# Patient Record
Sex: MALE | Race: WHITE | Employment: UNEMPLOYED | URBAN - METROPOLITAN AREA
[De-identification: names, ages, dates, MRNs, and addresses within clinical notes are randomized per-mention and may not be internally consistent; named-entity substitution may affect disease eponyms.]

---

## 2023-02-20 ENCOUNTER — HOSPITAL ENCOUNTER (EMERGENCY)
Age: 28
Discharge: HOME | End: 2023-02-20
Attending: EMERGENCY MEDICINE

## 2023-02-20 VITALS
HEIGHT: 73 IN | TEMPERATURE: 98.2 F | RESPIRATION RATE: 16 BRPM | WEIGHT: 175 LBS | OXYGEN SATURATION: 100 % | SYSTOLIC BLOOD PRESSURE: 154 MMHG | BODY MASS INDEX: 23.19 KG/M2 | DIASTOLIC BLOOD PRESSURE: 79 MMHG | HEART RATE: 87 BPM
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
## 2023-02-20 PROCEDURE — 90714 TD VACC NO PRESV 7 YRS+ IM: CPT | Performed by: EMERGENCY MEDICINE

## 2023-02-20 PROCEDURE — 90471 IMMUNIZATION ADMIN: CPT

## 2023-02-20 PROCEDURE — 99284 EMERGENCY DEPT VISIT MOD MDM: CPT

## 2023-02-20 PROCEDURE — 74011250637 HC RX REV CODE- 250/637: Performed by: EMERGENCY MEDICINE

## 2023-02-20 PROCEDURE — 74011000250 HC RX REV CODE- 250: Performed by: EMERGENCY MEDICINE

## 2023-02-20 PROCEDURE — 74011250636 HC RX REV CODE- 250/636: Performed by: EMERGENCY MEDICINE

## 2023-02-20 PROCEDURE — 75810000293 HC SIMP/SUPERF WND  RPR

## 2023-02-20 RX ORDER — ACETAMINOPHEN 500 MG
1000 TABLET ORAL ONCE
Status: COMPLETED | OUTPATIENT
Start: 2023-02-20 | End: 2023-02-20

## 2023-02-20 RX ORDER — BACITRACIN 500 [USP'U]/G
OINTMENT TOPICAL 3 TIMES DAILY
Qty: 1 EACH | Refills: 0 | Status: SHIPPED | OUTPATIENT
Start: 2023-02-20 | End: 2023-03-02

## 2023-02-20 RX ORDER — BACITRACIN ZINC 500 UNIT/G
1 OINTMENT IN PACKET (EA) TOPICAL DAILY
Status: DISCONTINUED | OUTPATIENT
Start: 2023-02-20 | End: 2023-02-20 | Stop reason: HOSPADM

## 2023-02-20 RX ORDER — LIDOCAINE HYDROCHLORIDE AND EPINEPHRINE 10; 10 MG/ML; UG/ML
10 INJECTION, SOLUTION INFILTRATION; PERINEURAL
Status: COMPLETED | OUTPATIENT
Start: 2023-02-20 | End: 2023-02-20

## 2023-02-20 RX ORDER — LIDOCAINE HYDROCHLORIDE 10 MG/ML
5 INJECTION INFILTRATION; PERINEURAL ONCE
Status: COMPLETED | OUTPATIENT
Start: 2023-02-20 | End: 2023-02-20

## 2023-02-20 RX ADMIN — LIDOCAINE HYDROCHLORIDE AND EPINEPHRINE 100 MG: 10; 10 INJECTION, SOLUTION INFILTRATION; PERINEURAL at 17:43

## 2023-02-20 RX ADMIN — CLOSTRIDIUM TETANI TOXOID ANTIGEN (FORMALDEHYDE INACTIVATED) AND CORYNEBACTERIUM DIPHTHERIAE TOXOID ANTIGEN (FORMALDEHYDE INACTIVATED) 0.5 ML: 5; 2 INJECTION, SUSPENSION INTRAMUSCULAR at 17:41

## 2023-02-20 RX ADMIN — LIDOCAINE HYDROCHLORIDE 5 ML: 10 INJECTION, SOLUTION INFILTRATION; PERINEURAL at 17:43

## 2023-02-20 RX ADMIN — ACETAMINOPHEN 1000 MG: 500 TABLET ORAL at 17:41

## 2023-02-20 RX ADMIN — Medication 1 PACKET: at 19:01

## 2023-02-20 NOTE — ED NOTES
Emergency Department Nursing Plan of Care       The Nursing Plan of Care is developed from the Nursing assessment and Emergency Department Attending provider initial evaluation. The plan of care may be reviewed in the ED Provider note.     The Plan of Care was developed with the following considerations:   Patient / Family readiness to learn indicated by:verbalized understanding  Persons(s) to be included in education: patient  Barriers to Learning/Limitations:No    Signed     Romina Webb RN    2/20/2023   6:10 PM

## 2023-02-20 NOTE — ED PROVIDER NOTES
EMERGENCY DEPARTMENT HISTORY AND PHYSICAL EXAM      Date: 2/20/2023  Patient Name: James Ferguson. History of Presenting Illness     Chief Complaint   Patient presents with    Laceration       History Provided By: Patient and Patient's Mother    HPI: James Perez, 32 y.o. male with a past medical history significant for denies presents to the ED with cc of laceration. Pt was cutting down trees and after finishing was putting chainsaw down and it clipped his L leg, cutting just below his R knee. Ambulatory on scene and since. Tetanus not up to date. There are no other complaints, changes, or physical findings at this time. PCP: No primary care provider on file. Current Facility-Administered Medications   Medication Dose Route Frequency Provider Last Rate Last Admin    lidocaine (XYLOCAINE) 10 mg/mL (1 %) injection 5 mL  5 mL IntraVENous ONCE Rhett Cheatham MD        tetanus-diphtheria Toxiods-PF 5-2 Lf unit/0.5 mL injection 0.5 mL  0.5 mL IntraMUSCular ONCE Rhett Cheatham MD        acetaminophen (TYLENOL) tablet 1,000 mg  1,000 mg Oral ONCE Rhett Cheatham MD        lidocaine-EPINEPHrine (XYLOCAINE) 1 %-1:100,000 injection 100 mg  10 mL IntraDERMal NOW Margaux Grier MD           Past History     Past Medical History:  History reviewed. No pertinent past medical history. Past Surgical History:  No past surgical history on file. Family History:  History reviewed. No pertinent family history. Social History: Allergies: Allergies   Allergen Reactions    Ceclor Cd Other (comments)    Penicillin Hives         Review of Systems   Constitutional: Negative except as in HPI. Eyes: Negative except as in HPI.  ENT: Negative except as in HPI. Cardiovascular: Negative except as in HPI. Respiratory: Negative except as in HPI. Gastrointestinal: Negative except as in HPI. Genitourinary: Negative except as in HPI. Musculoskeletal: Negative except as in HPI.   Integumentary: Negative except as in HPI. Neurological: Negative except as in HPI. Psychiatric: Negative except as in HPI. Endocrine: Negative except as in HPI. Hematologic/Lymphatic: Negative except as in HPI. Allergic/Immunologic: Negative except as in HPI. Physical Exam   Constitutional: Awake and alert, interactive, NAD  Eyes: PERRL, no injection or scleral icterus, no discharge  HEENT: NCAT, neck supple, MMM  CV: RRR  Respiratory: Unlabored  : Deferred  MSK: FROM, no joint effusions or edema. Able to flex and extend LLE against resistance without difficulty. Skin: 1cm superficial laceration medial aspect of L knee around tibial plateau, below it an approximately 4cm laceration with exposed fat and fascia but no violation of fascia or visualized tendon injuries. Neuro: CN2-12 intact, symmetric facies, fluent speech. Psych: Well-groomed, normal speech, behavior, appropriate mood    Lab and Diagnostic Study Results     Labs -   No results found for this or any previous visit (from the past 12 hour(s)). Radiologic Studies -   [unfilled]  CT Results  (Last 48 hours)      None          CXR Results  (Last 48 hours)      None            Medical Decision Making and ED Course   - I am the first and primary provider for this patient AND AM THE PRIMARY PROVIDER OF RECORD. - I reviewed the vital signs, available nursing notes, past medical history, past surgical history, family history and social history. - Initial assessment performed. The patients presenting problems have been discussed, and the staff are in agreement with the care plan formulated and outlined with them. I have encouraged them to ask questions as they arise throughout their visit. Vital Signs-Reviewed the patient's vital signs.     Patient Vitals for the past 12 hrs:   Temp Pulse Resp BP SpO2   02/20/23 1705 98.2 °F (36.8 °C) (!) 115 18 (!) 147/88 100 %       EKG interpretation:         Provider Notes (Medical Decision Making):   27M w/chainsaw injury to LLE causing laceration. Fortunately, wound appears superficial without violation of fascia/knee capsule or tendon injury. Will update tetanus and suture laceration closed with return precautions. ED Course:       ED Course as of 02/20/23 1851 Mon Feb 20, 2023 1815 Procedure Note - Laceration Repair:  6:16 PM  Procedure by AMY Lemus NP  Complexity: simple   4cm linear laceration to lower leg  was irrigated copiously with NS under jet lavage,  and draped in a sterile fashion. The area was anesthetized via local infiltration of 3 mL lidocaine 1% without epinephrine. The wound was explored with the following results: No foreign bodies found, No tendon laceration seen. The wound was repaired with One layer suture closure: Skin Layer:  12 sutures placed, stitch type:simple interrupted, suture: 4-0 prolene. .  The wound was closed with good hemostasis and approximation. Sterile dressing applied. Estimated blood loss: minimal  The procedure took 16-30 minutes, and pt tolerated well. [KR]   1849 Wound well-appearing, will discharge with return precautions. [YA]      ED Course User Index  [KR] Myla Cardozo NP  [YA] Roby Rowell MD         Procedures and Critical Care     See lac repair note above by JEREL Ling    Disposition     Disposition: DC- Adult Discharges: All of the diagnostic tests were reviewed and questions answered. Diagnosis, care plan and treatment options were discussed. The patient understands the instructions and will follow up as directed. The patients results have been reviewed with them. They have been counseled regarding their diagnosis. The patient verbally convey understanding and agreement of the signs, symptoms, diagnosis, treatment and prognosis and additionally agrees to follow up as recommended with their PCP in 24 - 48 hours. They also agree with the care-plan and convey that all of their questions have been answered.   I have also put together some discharge instructions for them that include: 1) educational information regarding their diagnosis, 2) how to care for their diagnosis at home, as well a 3) list of reasons why they would want to return to the ED prior to their follow-up appointment, should their condition change. Discharged      Diagnosis     Clinical Impression:   1. Contact with chainsaw as cause of accidental injury        Attestations:     Samanta Li MD

## 2023-02-20 NOTE — DISCHARGE INSTRUCTIONS
You should take tylenol or ibuprofen for aches and pains for the next few days. You can alternate these every 3 hours so that you always have something on board. For instance, you can take tylenol at 9am, ibuprofen at noon, tylenol again at 3pm and ibuprofen again at 6pm. Take in plenty of water to stay hydrated and follow up with your primary care doctor in the next few days. You can use bacitracin or neosporin on the wound to keep it from getting infected. Return to the ER for any severe pain, worsening redness, purulent discharge, fevers, difficulty bending your knee, or any other new or concerning symptoms. Thank you! Thank you for allowing me to care for you in the emergency department. It is my goal to provide you with excellent care. If you have not received excellent quality care, please ask to speak to the nurse manager. Please fill out the survey that will come to you by mail or email since we listen to your feedback! Below you will find a list of your tests from today's visit. Should you have any questions, please do not hesitate to call the emergency department. Labs  No results found for this or any previous visit (from the past 12 hour(s)). Radiologic Studies  No orders to display     CT Results  (Last 48 hours)      None          CXR Results  (Last 48 hours)      None          ------------------------------------------------------------------------------------------------------------  The exam and treatment you received in the Emergency Department were for an urgent problem and are not intended as complete care. It is important that you follow-up with a doctor, nurse practitioner, or physician assistant to:  (1) confirm your diagnosis,  (2) re-evaluation of changes in your illness and treatment, and  (3) for ongoing care. Please take your discharge instructions with you when you go to your follow-up appointment.      If you have any problem arranging a follow-up appointment, contact the Emergency Department. If your symptoms become worse or you do not improve as expected and you are unable to reach your health care provider, please return to the Emergency Department. We are available 24 hours a day. If a prescription has been provided, please have it filled as soon as possible to prevent a delay in treatment. If you have any questions or reservations about taking the medication due to side effects or interactions with other medications, please call your primary care provider or contact the ER.

## 2025-02-13 ENCOUNTER — HOSPITAL ENCOUNTER (EMERGENCY)
Facility: HOSPITAL | Age: 30
Discharge: HOME | End: 2025-02-13

## 2025-02-13 VITALS
TEMPERATURE: 99.4 F | HEART RATE: 94 BPM | SYSTOLIC BLOOD PRESSURE: 135 MMHG | WEIGHT: 178 LBS | HEIGHT: 74 IN | RESPIRATION RATE: 16 BRPM | OXYGEN SATURATION: 100 % | BODY MASS INDEX: 22.84 KG/M2 | DIASTOLIC BLOOD PRESSURE: 66 MMHG

## 2025-02-13 DIAGNOSIS — T15.01XA RUST RING OF RIGHT CORNEA DUE TO METALLIC FOREIGN BODY: Primary | ICD-10-CM

## 2025-02-13 PROCEDURE — 99283 EMERGENCY DEPT VISIT LOW MDM: CPT

## 2025-02-13 PROCEDURE — 65220 REMOVE FOREIGN BODY FROM EYE: CPT

## 2025-02-13 PROCEDURE — 6370000000 HC RX 637 (ALT 250 FOR IP): Performed by: PHYSICIAN ASSISTANT

## 2025-02-13 RX ORDER — TETRACAINE HYDROCHLORIDE 5 MG/ML
1 SOLUTION OPHTHALMIC ONCE
Status: COMPLETED | OUTPATIENT
Start: 2025-02-13 | End: 2025-02-13

## 2025-02-13 RX ORDER — CIPROFLOXACIN HYDROCHLORIDE 3.5 MG/ML
2 SOLUTION/ DROPS TOPICAL 4 TIMES DAILY
Qty: 2.5 ML | Refills: 0 | Status: SHIPPED | OUTPATIENT
Start: 2025-02-13 | End: 2025-02-18

## 2025-02-13 RX ORDER — KETOROLAC TROMETHAMINE 5 MG/ML
1 SOLUTION OPHTHALMIC 4 TIMES DAILY
Qty: 3 ML | Refills: 0 | Status: SHIPPED | OUTPATIENT
Start: 2025-02-13

## 2025-02-13 RX ADMIN — TETRACAINE HYDROCHLORIDE 1 DROP: 5 SOLUTION OPHTHALMIC at 16:38

## 2025-02-13 ASSESSMENT — VISUAL ACUITY
OS: 20/16
OU: 20/16
OD: 20/16

## 2025-02-13 ASSESSMENT — PAIN - FUNCTIONAL ASSESSMENT: PAIN_FUNCTIONAL_ASSESSMENT: 0-10

## 2025-02-13 ASSESSMENT — PAIN DESCRIPTION - ORIENTATION: ORIENTATION: RIGHT

## 2025-02-13 ASSESSMENT — PAIN SCALES - GENERAL: PAINLEVEL_OUTOF10: 6

## 2025-02-13 ASSESSMENT — PAIN DESCRIPTION - LOCATION: LOCATION: EYE

## 2025-02-13 NOTE — ED PROVIDER NOTES
[facility] EMERGENCY DEPT  EMERGENCY DEPARTMENT HISTORY AND PHYSICAL EXAM      Date: 2/13/2025  Patient Name: Orlando Garnica Jr.  MRN: [id number]  YOB: 1995  Date of evaluation: 2/13/2025  Provider: Freedom Seth PA-C   Note Started: 4:21 PM EST 2/13/25    HISTORY OF PRESENT ILLNESS     Chief Complaint   Patient presents with    Foreign Body in Eye       History Provided By: Patient    HPI: Orlando Garnica Jr. is a 29 y.o. male with no significant past medical history presents this ED with cc of eye pain.  Patient is a  and reports getting a piece of embedded in his right eye last night.  C/o associated pain/discomfort.  Attempted to remove the piece of metal himself but was unable to, probably ED today. Tetanus up-to-date.  Patient specifically denies any vision changes, photophobia, diplopia, discharge, pain with extraocular movement, headaches, headedness, dizziness, rashes.  Denies treating symptoms anything prior to arrival.  No additional complaints or concerns.  Otherwise well.    PAST MEDICAL HISTORY   Past Medical History:  History reviewed. No pertinent past medical history.    Past Surgical History:  History reviewed. No pertinent surgical history.    Family History:  History reviewed. No pertinent family history.    Social History:  Social History     Tobacco Use    Smoking status: Never    Smokeless tobacco: Never       Allergies:  Allergies   Allergen Reactions    Cefaclor Other (See Comments)    Penicillin G Hives       PCP: None, None    Current Meds:   No current facility-administered medications for this encounter.     Current Outpatient Medications   Medication Sig Dispense Refill    ketorolac (ACULAR) 0.5 % ophthalmic solution Place 1 drop into the right eye 4 times daily 3 mL 0    ciprofloxacin (CILOXAN) 0.3 % ophthalmic solution Place 2 drops into the right eye in the morning, at noon, in the evening, and at bedtime for 5 days 2.5 mL 0       Social Determinants of Health: